# Patient Record
Sex: FEMALE | Race: WHITE | URBAN - METROPOLITAN AREA
[De-identification: names, ages, dates, MRNs, and addresses within clinical notes are randomized per-mention and may not be internally consistent; named-entity substitution may affect disease eponyms.]

---

## 2020-08-02 ENCOUNTER — EMERGENCY (EMERGENCY)
Facility: HOSPITAL | Age: 20
LOS: 1 days | Discharge: HOME | End: 2020-08-02
Attending: EMERGENCY MEDICINE | Admitting: EMERGENCY MEDICINE
Payer: COMMERCIAL

## 2020-08-02 VITALS
WEIGHT: 125 LBS | RESPIRATION RATE: 16 BRPM | DIASTOLIC BLOOD PRESSURE: 75 MMHG | SYSTOLIC BLOOD PRESSURE: 117 MMHG | TEMPERATURE: 98 F | OXYGEN SATURATION: 97 % | HEIGHT: 64 IN | HEART RATE: 81 BPM

## 2020-08-02 VITALS
TEMPERATURE: 98 F | OXYGEN SATURATION: 98 % | SYSTOLIC BLOOD PRESSURE: 123 MMHG | HEART RATE: 77 BPM | DIASTOLIC BLOOD PRESSURE: 71 MMHG | RESPIRATION RATE: 17 BRPM

## 2020-08-02 DIAGNOSIS — R19.7 DIARRHEA, UNSPECIFIED: ICD-10-CM

## 2020-08-02 DIAGNOSIS — Z20.828 CONTACT WITH AND (SUSPECTED) EXPOSURE TO OTHER VIRAL COMMUNICABLE DISEASES: ICD-10-CM

## 2020-08-02 DIAGNOSIS — R10.30 LOWER ABDOMINAL PAIN, UNSPECIFIED: ICD-10-CM

## 2020-08-02 LAB
ALBUMIN SERPL ELPH-MCNC: 3.8 G/DL — SIGNIFICANT CHANGE UP (ref 3.4–5)
ALP SERPL-CCNC: 52 U/L — SIGNIFICANT CHANGE UP (ref 40–120)
ALT FLD-CCNC: 17 U/L — SIGNIFICANT CHANGE UP (ref 12–42)
ANION GAP SERPL CALC-SCNC: 9 MMOL/L — SIGNIFICANT CHANGE UP (ref 9–16)
APTT BLD: 27.6 SEC — SIGNIFICANT CHANGE UP (ref 27.5–35.5)
AST SERPL-CCNC: 24 U/L — SIGNIFICANT CHANGE UP (ref 15–37)
BASOPHILS # BLD AUTO: 0.05 K/UL — SIGNIFICANT CHANGE UP (ref 0–0.2)
BASOPHILS NFR BLD AUTO: 0.9 % — SIGNIFICANT CHANGE UP (ref 0–2)
BILIRUB SERPL-MCNC: 0.3 MG/DL — SIGNIFICANT CHANGE UP (ref 0.2–1.2)
BUN SERPL-MCNC: 8 MG/DL — SIGNIFICANT CHANGE UP (ref 7–23)
CALCIUM SERPL-MCNC: 9.1 MG/DL — SIGNIFICANT CHANGE UP (ref 8.5–10.5)
CHLORIDE SERPL-SCNC: 103 MMOL/L — SIGNIFICANT CHANGE UP (ref 96–108)
CK SERPL-CCNC: 69 U/L — SIGNIFICANT CHANGE UP (ref 26–192)
CO2 SERPL-SCNC: 25 MMOL/L — SIGNIFICANT CHANGE UP (ref 22–31)
CREAT SERPL-MCNC: 0.9 MG/DL — SIGNIFICANT CHANGE UP (ref 0.5–1.3)
EOSINOPHIL # BLD AUTO: 0 K/UL — SIGNIFICANT CHANGE UP (ref 0–0.5)
EOSINOPHIL NFR BLD AUTO: 0 % — SIGNIFICANT CHANGE UP (ref 0–6)
GLUCOSE SERPL-MCNC: 94 MG/DL — SIGNIFICANT CHANGE UP (ref 70–99)
HCG SERPL-ACNC: 1 MIU/ML — SIGNIFICANT CHANGE UP
HCT VFR BLD CALC: 40 % — SIGNIFICANT CHANGE UP (ref 34.5–45)
HGB BLD-MCNC: 13.9 G/DL — SIGNIFICANT CHANGE UP (ref 11.5–15.5)
IMM GRANULOCYTES NFR BLD AUTO: 0.4 % — SIGNIFICANT CHANGE UP (ref 0–1.5)
INR BLD: 1.11 — SIGNIFICANT CHANGE UP (ref 0.88–1.16)
LIDOCAIN IGE QN: 65 U/L — LOW (ref 73–393)
LYMPHOCYTES # BLD AUTO: 1.19 K/UL — SIGNIFICANT CHANGE UP (ref 1–3.3)
LYMPHOCYTES # BLD AUTO: 21.9 % — SIGNIFICANT CHANGE UP (ref 13–44)
MCHC RBC-ENTMCNC: 30.3 PG — SIGNIFICANT CHANGE UP (ref 27–34)
MCHC RBC-ENTMCNC: 34.8 GM/DL — SIGNIFICANT CHANGE UP (ref 32–36)
MCV RBC AUTO: 87.3 FL — SIGNIFICANT CHANGE UP (ref 80–100)
MONOCYTES # BLD AUTO: 0.38 K/UL — SIGNIFICANT CHANGE UP (ref 0–0.9)
MONOCYTES NFR BLD AUTO: 7 % — SIGNIFICANT CHANGE UP (ref 2–14)
NEUTROPHILS # BLD AUTO: 3.8 K/UL — SIGNIFICANT CHANGE UP (ref 1.8–7.4)
NEUTROPHILS NFR BLD AUTO: 69.8 % — SIGNIFICANT CHANGE UP (ref 43–77)
NRBC # BLD: 0 /100 WBCS — SIGNIFICANT CHANGE UP (ref 0–0)
PLATELET # BLD AUTO: 150 K/UL — SIGNIFICANT CHANGE UP (ref 150–400)
POTASSIUM SERPL-MCNC: 3.8 MMOL/L — SIGNIFICANT CHANGE UP (ref 3.5–5.3)
POTASSIUM SERPL-SCNC: 3.8 MMOL/L — SIGNIFICANT CHANGE UP (ref 3.5–5.3)
PROT SERPL-MCNC: 7.7 G/DL — SIGNIFICANT CHANGE UP (ref 6.4–8.2)
PROTHROM AB SERPL-ACNC: 13.3 SEC — SIGNIFICANT CHANGE UP (ref 10.6–13.6)
RBC # BLD: 4.58 M/UL — SIGNIFICANT CHANGE UP (ref 3.8–5.2)
RBC # FLD: 12.6 % — SIGNIFICANT CHANGE UP (ref 10.3–14.5)
SARS-COV-2 RNA SPEC QL NAA+PROBE: DETECTED
SODIUM SERPL-SCNC: 137 MMOL/L — SIGNIFICANT CHANGE UP (ref 132–145)
TSH SERPL-MCNC: 2.05 UIU/ML — SIGNIFICANT CHANGE UP (ref 0.36–3.74)
WBC # BLD: 5.44 K/UL — SIGNIFICANT CHANGE UP (ref 3.8–10.5)
WBC # FLD AUTO: 5.44 K/UL — SIGNIFICANT CHANGE UP (ref 3.8–10.5)

## 2020-08-02 PROCEDURE — 99284 EMERGENCY DEPT VISIT MOD MDM: CPT

## 2020-08-02 PROCEDURE — 76830 TRANSVAGINAL US NON-OB: CPT | Mod: 26

## 2020-08-02 PROCEDURE — 76705 ECHO EXAM OF ABDOMEN: CPT | Mod: 26

## 2020-08-02 PROCEDURE — 76856 US EXAM PELVIC COMPLETE: CPT | Mod: 26

## 2020-08-02 RX ORDER — ONDANSETRON 8 MG/1
4 TABLET, FILM COATED ORAL ONCE
Refills: 0 | Status: COMPLETED | OUTPATIENT
Start: 2020-08-02 | End: 2020-08-02

## 2020-08-02 RX ORDER — SODIUM CHLORIDE 9 MG/ML
1000 INJECTION, SOLUTION INTRAVENOUS
Refills: 0 | Status: DISCONTINUED | OUTPATIENT
Start: 2020-08-02 | End: 2020-08-06

## 2020-08-02 RX ORDER — FAMOTIDINE 10 MG/ML
20 INJECTION INTRAVENOUS ONCE
Refills: 0 | Status: COMPLETED | OUTPATIENT
Start: 2020-08-02 | End: 2020-08-02

## 2020-08-02 RX ORDER — KETOROLAC TROMETHAMINE 30 MG/ML
15 SYRINGE (ML) INJECTION ONCE
Refills: 0 | Status: DISCONTINUED | OUTPATIENT
Start: 2020-08-02 | End: 2020-08-02

## 2020-08-02 RX ADMIN — FAMOTIDINE 100 MILLIGRAM(S): 10 INJECTION INTRAVENOUS at 16:34

## 2020-08-02 RX ADMIN — Medication 15 MILLIGRAM(S): at 17:50

## 2020-08-02 RX ADMIN — ONDANSETRON 4 MILLIGRAM(S): 8 TABLET, FILM COATED ORAL at 16:34

## 2020-08-02 RX ADMIN — Medication 30 MILLILITER(S): at 16:33

## 2020-08-02 RX ADMIN — Medication 10 MILLIGRAM(S): at 17:50

## 2020-08-02 RX ADMIN — SODIUM CHLORIDE 500 MILLILITER(S): 9 INJECTION, SOLUTION INTRAVENOUS at 16:33

## 2020-08-02 NOTE — ED PROVIDER NOTE - NSFOLLOWUPINSTRUCTIONS_ED_ALL_ED_FT
[facility]    Prevent the Spread of COVID-19 if You Are Sick  If you are sick with COVID-19 or think you might have COVID-19, follow the steps below to help protect other people in your home and community.   Stay home except to get medical care.  Stay home. Most people with COVID-19 have mild illness and are able to recover at home without medical care. Do not leave your home, except to get medical care. Do not visit public areas.Take care of yourself. Get rest and stay hydrated.Get medical care when needed. Call your doctor before you go to their office for care. But, if you have trouble breathing or other concerning symptoms, call 911 for immediate help.Avoid public transportation, ride-sharing, or taxis.Separate yourself from other people and pets in your home.  As much as possible, stay in a specific room and away from other people and pets in your home. Also, you should use a separate bathroom, if available. If you need to be around other people or animals in or outside of the home, wear a cloth face covering.  See COVID-19 and Animals if you have questions about pets: https://www.cdc.gov/coronavirus/2019-ncov/faq.html#JXLBC80suoozumZqzmzmh your symptoms.  Common symptoms of COVID-19 include fever and cough. Trouble breathing is a more serious symptom that means you should get medical attention.Follow care instructions from your healthcare provider and local health department. Your local health authorities will give instructions on checking your symptoms and reporting information.If you develop emergency warning signs for COVID-19 get medical attention immediately.   Emergency warning signs include*:  Trouble breathing Persistent pain or pressure in the chest New confusion or not able to be wokenBluish lips or face*This list is not all inclusive. Please consult your medical provider for any other symptoms that are severe or concerning to you.  Call 911 if you have a medical emergency. If you have a medical emergency and need to call 911, notify the  that you have or think you might have, COVID-19. If possible, put on a facemask before medical help arrives.  Call ahead before visiting your doctor.  Call ahead. Many medical visits for routine care are being postponed or done by phone or telemedicine. If you have a medical appointment that cannot be postponed, call your doctor's office. This will help the office protect themselves and other patients.If you are sick, wear a cloth covering over your nose and mouth.  You should wear a cloth face covering over your nose and mouth if you must be around other people or animals, including pets (even at home).You don't need to wear the cloth face covering if you are alone. If you can't put on a cloth face covering (because of trouble breathing for example), cover your coughs and sneezes in some other way. Try to stay at least 6 feet away from other people. This will help protect the people around you.Note: During the COVID-19 pandemic, medical grade facemasks are reserved for healthcare workers and some first responders. You may need to make a cloth face covering using a scarf or bandana.  Cover your coughs and sneezes.  Cover your mouth and nose with a tissue when you cough or sneeze.Throw used tissues in a lined trash can. Immediately wash your hands with soap and water for at least 20 seconds. If soap and water are not available, clean your hands with an alcohol-based hand  that contains at least 60% alcohol.Clean your hands often.  Wash your hands often with soap and water for at least 20 seconds. This is especially important after blowing your nose, coughing, or sneezing; going to the bathroom; and before eating or preparing food. Use hand  if soap and water are not available. Use an alcohol-based hand  with at least 60% alcohol, covering all surfaces of your hands and rubbing them together until they feel dry. Soap and water are the best option, especially if your hands are visibly dirty. Avoid touching your eyes, nose, and mouth with unwashed hands.Avoid sharing personal household items.  Do not share dishes, drinking glasses, cups, eating utensils, towels, or bedding with other people in your home. Wash these items thoroughly after using them with soap and water or put them in the .Clean all "high-touch" surfaces everyday.  Clean and disinfect high-touch surfaces in your "sick room" and bathroom. Let someone else clean and disinfect surfaces in common areas, but not your bedroom and bathroom. If a caregiver or other person needs to clean and disinfect a sick person's bedroom or bathroom, they should do so on an as-needed basis. The caregiver/other person should wear a mask and wait as long as possible after the sick person has used the bathroom.High-touch surfaces include phones, remote controls, counters, tabletops, doorknobs, bathroom fixtures, toilets, keyboards, tablets, and bedside tables.  Clean and disinfect areas that may have blood, stool, or body fluids on them.Use household  and disinfectants. Clean the area or item with soap and water or another detergent if it is dirty. Then use a household disinfectant.  Be sure to follow the instructions on the label to ensure safe and effective use of the product. Many products recommend keeping the surface wet for several minutes to ensure germs are killed. Many also recommend precautions such as wearing gloves and making sure you have good ventilation during use of the product. Most EPA-registered household disinfectants should be effective.How to discontinue home isolation  People with COVID-19 who have stayed home (home isolated) can stop home isolation under the following conditions:   If you will not have a test to determine if you are still contagious, you can leave home after these three things have happened:   You have had no fever for at least 72 hours (that is three full days of no fever without the use of medicine that reduces fevers) AND other symptoms have improved (for example, when your cough or shortness of breath has improved) ANDat least 10 days have passed since your symptoms first appeared.If you will be tested to determine if you are still contagious, you can leave home after these three things have happened:   You no longer have a fever (without the use of medicine that reduces fevers) AND other symptoms have improved (for example, when your cough or shortness of breath has improved) AND you received two negative tests in a row, 24 hours apart. Your doctor will follow CDC guidelines.In all cases, follow the guidance of your healthcare provider and local health department. The decision to stop home isolation should be made in consultation with your healthcare provider and state and local health departments. Local decisions depend on local circumstances.  cdc.gov/coronavirus   05/03/2020  This information is not intended to replace advice given to you by your health care provider. Make sure you discuss any questions you have with your health care provider.    Document Released: 04/14/2020 Document Revised: 05/13/2020 Document Reviewed: 04/14/2020  [employer] Patient Education © 2020 [employer]    ArabicBosnianCRidgeview Le Sue[facility] Centeran Cambridge HospitalogTraditional ChineseVietnamese    Diarrhea, Adult  Diarrhea is frequent loose and watery bowel movements. Diarrhea can make you feel weak and cause you to become dehydrated. Dehydration can make you tired and thirsty, cause you to have a dry mouth, and decrease how often you urinate.  Diarrhea typically lasts 2–3 days. However, it can last longer if it is a sign of something more serious. It is important to treat your diarrhea as told by your health care provider.  Follow these instructions at home:  Eating and drinking         Follow these recommendations as told by your health care provider:  Take an oral rehydration solution (ORS). This is an over-the-counter medicine that helps return your body to its normal balance of nutrients and water. It is found at pharmacies and retail stores.Drink plenty of fluids, such as water, ice chips, diluted fruit juice, and low-calorie sports drinks. You can drink milk also, if desired.Avoid drinking fluids that contain a lot of sugar or caffeine, such as energy drinks, sports drinks, and soda.Eat bland, easy-to-digest foods in small amounts as you are able. These foods include bananas, applesauce, rice, lean meats, toast, and crackers.Avoid alcohol.Avoid spicy or fatty foods.Medicines     Take over-the-counter and prescription medicines only as told by your health care provider.If you were prescribed an antibiotic medicine, take it as told by your health care provider. Do not stop using the antibiotic even if you start to feel better.General instructions        Wash your hands often using soap and water. If soap and water are not available, use a hand . Others in the household should wash their hands as well. Hands should be washed:  After using the toilet or changing a diaper. Before preparing, cooking, or serving food. While caring for a sick person or while visiting someone in a hospital. Drink enough fluid to keep your urine pale yellow.Rest at home while you recover.Watch your condition for any changes.Take a warm bath to relieve any burning or pain from frequent diarrhea episodes.Keep all follow-up visits as told by your health care provider. This is important.Contact a health care provider if:  You have a fever.Your diarrhea gets worse.You have new symptoms.You cannot keep fluids down.You feel light-headed or dizzy.You have a headache.You have muscle cramps.Get help right away if:  You have chest pain.You feel extremely weak or you faint.You have bloody or black stools or stools that look like tar.You have severe pain, cramping, or bloating in your abdomen.You have trouble breathing or you are breathing very quickly.Your heart is beating very quickly.Your skin feels cold and clammy.You feel confused.You have signs of dehydration, such as:  Dark urine, very little urine, or no urine.Cracked lips.Dry mouth.Sunken eyes.Sleepiness.Weakness.Summary  Diarrhea is frequent loose and watery bowel movements. Diarrhea can make you feel weak and cause you to become dehydrated.Drink enough fluids to keep your urine pale yellow.Make sure that you wash your hands after using the toilet. If soap and water are not available, use hand .Contact a health care provider if your diarrhea gets worse or you have new symptoms.Get help right away if you have signs of dehydration.This information is not intended to replace advice given to you by your health care provider. Make sure you discuss any questions you have with your health care provider.    Document Released: 12/08/2003 Document Revised: 05/24/2019 Document Reviewed: 05/24/2019  [employer] Patient Education © 2020 [employer]

## 2020-08-02 NOTE — ED PROVIDER NOTE - PROGRESS NOTE DETAILS
I feel that the patient has decision making capacity as the patient demonstrates ability to understand the current clinical situation and is able to communicate a choice for what they want to do. There is no evidence of intoxication of altered mentation which would preclude normal cognitive function. The patient continually is able to express their understanding of the benefits, risks, and alternatives and continues to be able to make logical and rational choices.    Shared decision making made at bedside and after being informed of their clinical exam and results, patient elects to be discharged home with outpatient follow up, declining any further ED intervention, CT imaging, or workup, and is able to verbalize understanding of strict return precautions to the ED.    Patient is AAO x 3, appears well, appears nontoxic, is in NAD, and can ambulate with steady, brisk, unassisted gait in the ED with no signs of ataxia. Patient currently appears well, appears nontoxic, is in NAD, and can ambulate with steady, brisk, unassisted gait in the ED.

## 2020-08-02 NOTE — ED ADULT NURSE NOTE - OBJECTIVE STATEMENT
lower abdominal pain, diarrhea, fatigue since thursday, no s/s of distress, boyfriend at bedside, no s/s of distress, awaiting provider eval, lmp 07/31/20

## 2020-08-02 NOTE — ED PROVIDER NOTE - CLINICAL SUMMARY MEDICAL DECISION MAKING FREE TEXT BOX
Pt currently on day 2 of menses p/w intermittent abdominal discomfort x 4 days and intermittent diarrhea x 1 month. No fever, chills. On arrival, vital signs stable. On exam, pt abdomen is minimally tender to the LLQ, not distended, soft - pt is well appearing and sitting cross-legged in the stretcher. Will order basic labs including a magnesium and lipase, UA, Upreg, COVID-19 swab, transvaginal/abd/pelvis US. anti-emetics and GI cocktail (Maalox & pepcid) ordered for symptom relief. Will reassess

## 2020-08-02 NOTE — ED PROVIDER NOTE - OBJECTIVE STATEMENT
20 yo F w/ no PMHx/PSHx presents to the ED accompanied with BF c/o intermittent lower abdominal pain x 4 days and intermittent NBNB diarrhea x 1 month. Pt was in normal state of health, following her usual routine prior to onset of symptoms; pt denies correlation to food. Pt denies recent diet changes, new medications, LE swelling, history of blood clots/Crohn's/IBS. Pt is currently menstruating (on the 2nd day of the cycle) and has an IUD placed and has noted heavier periods with more clots since IUD placement. Denies vaginal d/c, fever, chills, dysuria, flank pain, CP, SOB, cough, N/V, HA, dizziness, and generalized weakness. Pt has not had known contact with COVID-19 and denies recent travel.

## 2020-08-02 NOTE — ED PROVIDER NOTE - PATIENT PORTAL LINK FT
You can access the FollowMyHealth Patient Portal offered by [facility] by registering at the following website: [email or web address]. By joining [employer]’s FollowMyHealth portal, you will also be able to view your health information using other applications (apps) compatible with our system.

## 2020-08-02 NOTE — ED PROVIDER NOTE - GENITOURINARY, MLM
Chaperone: Allison Dowling (Scribe). Exam: no palpable masses, no rash, no lesions, no adnexal tenderness, no CMT, numerous clots in the vaginal canal

## 2020-08-02 NOTE — ED ADULT TRIAGE NOTE - CHIEF COMPLAINT QUOTE
Pt with intermittent LLQ pain/tenderness x4 day along with up to 3x day non bloody diarrhea. No N/V or fever

## 2020-08-03 LAB
B-OH-BUTYR SERPL-SCNC: 0.4 MMOL/L — SIGNIFICANT CHANGE UP
OSMOLALITY SERPL: 277 MOSMOL/KG — SIGNIFICANT CHANGE UP (ref 275–300)